# Patient Record
Sex: MALE | Race: WHITE | NOT HISPANIC OR LATINO
[De-identification: names, ages, dates, MRNs, and addresses within clinical notes are randomized per-mention and may not be internally consistent; named-entity substitution may affect disease eponyms.]

---

## 2022-08-26 PROBLEM — Z00.00 ENCOUNTER FOR PREVENTIVE HEALTH EXAMINATION: Status: ACTIVE | Noted: 2022-08-26

## 2022-09-26 ENCOUNTER — APPOINTMENT (OUTPATIENT)
Dept: VASCULAR SURGERY | Facility: CLINIC | Age: 62
End: 2022-09-26

## 2022-09-26 VITALS
OXYGEN SATURATION: 97 % | TEMPERATURE: 98.8 F | SYSTOLIC BLOOD PRESSURE: 126 MMHG | WEIGHT: 120 LBS | HEIGHT: 69 IN | HEART RATE: 81 BPM | BODY MASS INDEX: 17.77 KG/M2 | RESPIRATION RATE: 18 BRPM | DIASTOLIC BLOOD PRESSURE: 70 MMHG

## 2022-09-26 DIAGNOSIS — J45.40 MODERATE PERSISTENT ASTHMA, UNCOMPLICATED: ICD-10-CM

## 2022-09-26 DIAGNOSIS — I35.9 NONRHEUMATIC AORTIC VALVE DISORDER, UNSPECIFIED: ICD-10-CM

## 2022-09-26 DIAGNOSIS — Z80.42 FAMILY HISTORY OF MALIGNANT NEOPLASM OF PROSTATE: ICD-10-CM

## 2022-09-26 DIAGNOSIS — Z83.3 FAMILY HISTORY OF DIABETES MELLITUS: ICD-10-CM

## 2022-09-26 DIAGNOSIS — G35 MULTIPLE SCLEROSIS: ICD-10-CM

## 2022-09-26 DIAGNOSIS — Z78.9 OTHER SPECIFIED HEALTH STATUS: ICD-10-CM

## 2022-09-26 PROCEDURE — 99203 OFFICE O/P NEW LOW 30 MIN: CPT

## 2022-09-26 NOTE — HISTORY OF PRESENT ILLNESS
[FreeTextEntry1] : 61-year-old male here for evaluation of longstanding lower extremity venous stasis.\par He has a longstanding bilateral medial malleoli are on and off ulcerations.  He has been assessed and treated by a number of vascular surgeons in the past.  He denies having undergone venous procedures in the past.  He has received multiple different diagnoses and recommendations.\par He reports that he developed a right medial malleoli are superficial ulceration over the last several months, which he is caring for by himself.  He has a healed left medial malleoli ulceration.\par He denies a prior history of DVT.  He has varicose veins of both lower extremities.

## 2022-09-26 NOTE — ASSESSMENT
[FreeTextEntry1] : 61-year-old male with longstanding bilateral lower extremity venous stasis, current right medial malleoli are ulceration.  He will require wound care for his right leg ulceration, and as such I have arranged for him to see me at the wound care center and the next appointment.  In the interim, he will continue to apply mupirocin and a dry gauze dressing along with compression therapy.\par In addition, we will attempt to obtain all the results of the prior vascular work-up that has been done by his other vascular consultants, in hopes to avoid repeat work-up.  He will attempt to bring results of prior work-up to the wound care visit next week.

## 2022-09-26 NOTE — PHYSICAL EXAM
[Normal Breath Sounds] : Normal breath sounds [2+] : left 2+ [Ankle Swelling (On Exam)] : not present [Varicose Veins Of Lower Extremities] : bilaterally [] : bilaterally [Alert] : alert [Oriented to Person] : oriented to person [Oriented to Place] : oriented to place [Calm] : calm [de-identified] : NAD [de-identified] : NCAT [de-identified] : supple [de-identified] : Soft, nontender, nondistended [de-identified] : Right medial malleoli are area with superficial ulceration, fibrinous exudate no erythema; left medial malleoli are area with appearance of healed ulceration

## 2022-10-07 ENCOUNTER — APPOINTMENT (OUTPATIENT)
Dept: SURGERY | Facility: CLINIC | Age: 62
End: 2022-10-07

## 2022-10-10 ENCOUNTER — APPOINTMENT (OUTPATIENT)
Dept: VASCULAR SURGERY | Facility: CLINIC | Age: 62
End: 2022-10-10

## 2022-10-14 ENCOUNTER — APPOINTMENT (OUTPATIENT)
Dept: SURGERY | Facility: CLINIC | Age: 62
End: 2022-10-14

## 2022-10-21 ENCOUNTER — APPOINTMENT (OUTPATIENT)
Dept: SURGERY | Facility: CLINIC | Age: 62
End: 2022-10-21

## 2022-11-28 ENCOUNTER — APPOINTMENT (OUTPATIENT)
Dept: VASCULAR SURGERY | Facility: CLINIC | Age: 62
End: 2022-11-28
Payer: MEDICARE

## 2022-11-28 VITALS
WEIGHT: 127 LBS | HEART RATE: 79 BPM | TEMPERATURE: 98 F | OXYGEN SATURATION: 97 % | BODY MASS INDEX: 18.75 KG/M2 | SYSTOLIC BLOOD PRESSURE: 113 MMHG | RESPIRATION RATE: 16 BRPM | DIASTOLIC BLOOD PRESSURE: 72 MMHG

## 2022-11-28 PROCEDURE — 99214 OFFICE O/P EST MOD 30 MIN: CPT

## 2022-12-01 PROBLEM — Z00.00 ENCOUNTER FOR PREVENTIVE HEALTH EXAMINATION: Status: ACTIVE | Noted: 2022-12-01

## 2023-01-10 NOTE — PHYSICAL EXAM
[Normal Breath Sounds] : Normal breath sounds [2+] : left 2+ [Ankle Swelling (On Exam)] : not present [Varicose Veins Of Lower Extremities] : bilaterally [] : bilaterally [Alert] : alert [Oriented to Person] : oriented to person [Oriented to Place] : oriented to place [Calm] : calm [de-identified] : NAD [de-identified] : NCAT [de-identified] : supple [de-identified] : Soft, nontender, nondistended [de-identified] : Bilateral medial malleoli regions with healed ulcerations no active ulcer

## 2023-01-10 NOTE — HISTORY OF PRESENT ILLNESS
[FreeTextEntry1] : 61-year-old male here for evaluation of longstanding lower extremity venous stasis.\par He has a longstanding bilateral medial malleoli are on and off ulcerations.  He has been assessed and treated by a number of vascular surgeons in the past.  He denies having undergone venous procedures in the past.  He has received multiple different diagnoses and recommendations.\par He reports that he developed a right medial malleoli are superficial ulceration over the last several months, which he is caring for by himself.  He has a healed left medial malleoli ulceration.\par He denies a prior history of DVT.  He has varicose veins of both lower extremities. [de-identified] : 12/28/22 - He extremity ulcerations have healed since her last office visit.  He has brought all the paperwork of his past vascular work-up pain.

## 2023-01-10 NOTE — ASSESSMENT
[FreeTextEntry1] : 61-year-old male with longstanding bilateral lower extremity venous stasis, history of on and off bilateral medial malleoli ulcerations, currently oral ulcers healed.\par I have reviewed his extensive vascular records which includes office appointments and reports of a CT as well as ultrasound. He does not have evidence of DVT or SVT, but there is inconclusive evidence of venous outflow stenosis in the iliac system.  There is studies including CT and MR which are not congruent.  He underwent venous duplexes in the past which revealed superficial venous reflux in focal parts of the very small saphenous vein.  He was advised to undergo IVUS to clearly delineate his venous anatomy and assess for a non-thrombotic iliac vein obstruction.  He has not yet undergone this procedure.\par I also reiterated to him that I this would be more definitive in diagnosing iliac vein pathology, and if he does have this and is treated appropriately, it may decrease recurrence of venous ulcerations.  He will consider this.

## 2023-05-22 ENCOUNTER — APPOINTMENT (OUTPATIENT)
Dept: VASCULAR SURGERY | Facility: CLINIC | Age: 63
End: 2023-05-22
Payer: MEDICARE

## 2023-05-22 VITALS
SYSTOLIC BLOOD PRESSURE: 100 MMHG | OXYGEN SATURATION: 95 % | WEIGHT: 131 LBS | DIASTOLIC BLOOD PRESSURE: 70 MMHG | HEART RATE: 55 BPM | BODY MASS INDEX: 19.35 KG/M2

## 2023-05-22 PROCEDURE — 99213 OFFICE O/P EST LOW 20 MIN: CPT

## 2023-05-30 NOTE — PHYSICAL EXAM
[Normal Breath Sounds] : Normal breath sounds [2+] : left 2+ [Ankle Swelling (On Exam)] : not present [Varicose Veins Of Lower Extremities] : bilaterally [] : bilaterally [Alert] : alert [Oriented to Person] : oriented to person [Oriented to Place] : oriented to place [Calm] : calm [de-identified] : NAD [de-identified] : NCAT [de-identified] : supple [de-identified] : Soft, nontender, nondistended [de-identified] : Bilateral medial malleoli regions with healed ulcerations no active ulcer

## 2023-05-30 NOTE — HISTORY OF PRESENT ILLNESS
[FreeTextEntry1] : 61-year-old male here for evaluation of longstanding lower extremity venous stasis.\par He has a longstanding bilateral medial malleoli are on and off ulcerations.  He has been assessed and treated by a number of vascular surgeons in the past.  He denies having undergone venous procedures in the past.  He has received multiple different diagnoses and recommendations.\par He reports that he developed a right medial malleoli are superficial ulceration over the last several months, which he is caring for by himself.  He has a healed left medial malleoli ulceration.\par He denies a prior history of DVT.  He has varicose veins of both lower extremities. [de-identified] : 5/22/23 - His lower extremity ulcerations continue to remain healed.  He denies recurrent ulcerations.

## 2023-05-30 NOTE — ASSESSMENT
[FreeTextEntry1] : 62-year-old male with longstanding bilateral lower extremity venous stasis, history of on and off bilateral medial malleoli ulcerations, currently oral ulcers healed.\par I have reviewed his extensive vascular records which includes office appointments and reports of a CT as well as ultrasound. He does not have evidence of DVT or SVT, but there is inconclusive evidence of venous outflow stenosis in the iliac system.  There is studies including CT and MR which are not congruent.  He underwent venous duplexes in the past which revealed superficial venous reflux in focal parts of the very small saphenous vein.  He was advised to undergo IVUS to clearly delineate his venous anatomy and assess for a non-thrombotic iliac vein obstruction.  He has not yet undergone this procedure.\par I also reiterated to him that I this would be more definitive in diagnosing iliac vein pathology, and if he does have this and is treated appropriately, it may decrease recurrence of venous ulcerations.  I also advised repeat bilateral lower extremity venous ultrasounds to assess his deep and superficial system for significant reflux disease, has a past ultrasounds which we have quite old.\par \par He will consider my recommendations.

## 2023-07-10 ENCOUNTER — APPOINTMENT (OUTPATIENT)
Dept: VASCULAR SURGERY | Facility: CLINIC | Age: 63
End: 2023-07-10
Payer: MEDICARE

## 2023-07-10 PROCEDURE — 93970 EXTREMITY STUDY: CPT

## 2023-08-10 ENCOUNTER — APPOINTMENT (OUTPATIENT)
Dept: VASCULAR SURGERY | Facility: CLINIC | Age: 63
End: 2023-08-10

## 2023-09-25 ENCOUNTER — APPOINTMENT (OUTPATIENT)
Dept: VASCULAR SURGERY | Facility: CLINIC | Age: 63
End: 2023-09-25
Payer: MEDICARE

## 2023-09-25 VITALS
SYSTOLIC BLOOD PRESSURE: 110 MMHG | OXYGEN SATURATION: 99 % | BODY MASS INDEX: 18.81 KG/M2 | HEART RATE: 76 BPM | DIASTOLIC BLOOD PRESSURE: 70 MMHG | WEIGHT: 127 LBS | HEIGHT: 69 IN

## 2023-09-25 PROCEDURE — 99213 OFFICE O/P EST LOW 20 MIN: CPT

## 2023-10-26 ENCOUNTER — APPOINTMENT (OUTPATIENT)
Dept: VASCULAR SURGERY | Facility: CLINIC | Age: 63
End: 2023-10-26
Payer: MEDICARE

## 2023-10-26 VITALS — WEIGHT: 125 LBS | HEIGHT: 69 IN | BODY MASS INDEX: 18.51 KG/M2

## 2023-10-26 PROCEDURE — 99214 OFFICE O/P EST MOD 30 MIN: CPT

## 2023-11-02 ENCOUNTER — APPOINTMENT (OUTPATIENT)
Dept: VASCULAR SURGERY | Facility: CLINIC | Age: 63
End: 2023-11-02
Payer: MEDICARE

## 2023-11-02 PROCEDURE — 99213 OFFICE O/P EST LOW 20 MIN: CPT

## 2023-11-09 ENCOUNTER — APPOINTMENT (OUTPATIENT)
Dept: VASCULAR SURGERY | Facility: CLINIC | Age: 63
End: 2023-11-09

## 2023-11-13 ENCOUNTER — APPOINTMENT (OUTPATIENT)
Dept: VASCULAR SURGERY | Facility: CLINIC | Age: 63
End: 2023-11-13
Payer: MEDICARE

## 2023-11-13 VITALS
BODY MASS INDEX: 18.22 KG/M2 | SYSTOLIC BLOOD PRESSURE: 118 MMHG | WEIGHT: 123 LBS | DIASTOLIC BLOOD PRESSURE: 76 MMHG | RESPIRATION RATE: 16 BRPM | HEART RATE: 69 BPM | OXYGEN SATURATION: 98 % | TEMPERATURE: 98.4 F | HEIGHT: 69 IN

## 2023-11-13 PROCEDURE — 99214 OFFICE O/P EST MOD 30 MIN: CPT

## 2023-12-11 ENCOUNTER — APPOINTMENT (OUTPATIENT)
Dept: VASCULAR SURGERY | Facility: CLINIC | Age: 63
End: 2023-12-11
Payer: MEDICARE

## 2023-12-14 ENCOUNTER — APPOINTMENT (OUTPATIENT)
Dept: VASCULAR SURGERY | Facility: CLINIC | Age: 63
End: 2023-12-14
Payer: MEDICARE

## 2023-12-14 PROCEDURE — 99213 OFFICE O/P EST LOW 20 MIN: CPT | Mod: 95

## 2023-12-14 NOTE — REASON FOR VISIT
[Home] : at home, [unfilled] , at the time of the visit. [Medical Office: ([facility])___] : at the medical office located in  [Patient] : the patient [Self] : self [This encounter was initiated by telehealth (audio with video) and converted to telephone (audio only) due to technical difficulties.] : This encounter was initiated by telehealth (audio with video) and converted to telephone (audio only) due to technical difficulties. [Follow-Up: _____] : a [unfilled] follow-up visit

## 2023-12-18 ENCOUNTER — APPOINTMENT (OUTPATIENT)
Dept: VASCULAR SURGERY | Facility: CLINIC | Age: 63
End: 2023-12-18

## 2024-01-19 NOTE — HISTORY OF PRESENT ILLNESS
[FreeTextEntry1] : 61-year-old male here for evaluation of longstanding lower extremity venous stasis.\par  He has a longstanding bilateral medial malleoli are on and off ulcerations.  He has been assessed and treated by a number of vascular surgeons in the past.  He denies having undergone venous procedures in the past.  He has received multiple different diagnoses and recommendations.\par  He reports that he developed a right medial malleoli are superficial ulceration over the last several months, which he is caring for by himself.  He has a healed left medial malleoli ulceration.\par  He denies a prior history of DVT.  He has varicose veins of both lower extremities. [de-identified] : 5/22/23 - His lower extremity ulcerations continue to remain healed.  He denies recurrent ulcerations.  9/25/23 - He reports that he has developed bilateral medial malleolus focal ulcerations, which she has performed his own wound care for.  He underwent venous ultrasound which revealed bilateral saphenous vein reflux.  10/26/23 - The patient returns in follow up for bilateral medial malleolus ulcerations.  His medial malleoli or ulcerations are slow to heal.  11/2/23 - The patient returns in follow up. He tolerated the Unna boots but was unhappy with his experience. His right medial malleolus ulceration on the left is healed and his right medial malleolus ulceration is improving.  11/13/23 - He reports that he kept his Unna boots on several days longer than instructed, he was unable to make his last appointment. Bilateral medial malleoli or ulcerations are now fully healed.  12/14/23 - The patient returned via telehealth visit. His right leg ulcer has re-opened since his last visit. He reports a scab has started to form over the wound. He reports that he has developed gastroenteritis. He would like to discuss the details of ablative treatment.

## 2024-01-19 NOTE — ASSESSMENT
[FreeTextEntry1] : 63-year-old male with a history of recurrent bilateral medial malleoli or ulcerations was undergone extensive vascular work-up by multiple vascular surgeons in the past. He has received a variety of opinions, he has not undergone any intervention. On work-up, he does have saphenous vein superficial reflux. I discussed with him that strongly recommend Unna boot treatment for closure of his currently active ulcerations. I have advised him to see me at the wound care center for this and have assisted him in obtaining an appointment either this Wednesday or next Monday. In addition, I have advised closure of the superficial venous reflux to minimize chance of recurrence of ulcerations. I discussed that we can proceed with this after his ulcer is closed.  On follow up, the patient's right medial malleolus ulcerations has reopened. I recommended he follow up in person for further evaluation. I explained that I need to examine him in person before I can recommend treatment options for his wound. He agrees with this plan. He will continue use of compression on a daily basis.

## 2024-01-19 NOTE — END OF VISIT
[FreeTextEntry3] : All medical record entries made by the Patriciaibe were at my, KARAN SHEA, direction and personally dictated by me on 12/14/2023. I have reviewed the chart and agree that the record accurately reflects my personal performance of the history, physical exam, assessment and plan. I have also personally directed, reviewed, and agreed with the chart.

## 2024-01-25 ENCOUNTER — APPOINTMENT (OUTPATIENT)
Dept: CARDIOLOGY | Facility: CLINIC | Age: 64
End: 2024-01-25
Payer: MEDICARE

## 2024-01-25 ENCOUNTER — NON-APPOINTMENT (OUTPATIENT)
Age: 64
End: 2024-01-25

## 2024-01-25 VITALS
DIASTOLIC BLOOD PRESSURE: 68 MMHG | HEIGHT: 69 IN | BODY MASS INDEX: 18.37 KG/M2 | WEIGHT: 124 LBS | OXYGEN SATURATION: 99 % | HEART RATE: 64 BPM | SYSTOLIC BLOOD PRESSURE: 104 MMHG

## 2024-01-25 DIAGNOSIS — N40.0 BENIGN PROSTATIC HYPERPLASIA WITHOUT LOWER URINARY TRACT SYMPMS: ICD-10-CM

## 2024-01-25 DIAGNOSIS — Z87.39 PERSONAL HISTORY OF OTHER DISEASES OF THE MUSCULOSKELETAL SYSTEM AND CONNECTIVE TISSUE: ICD-10-CM

## 2024-01-25 DIAGNOSIS — Z86.79 PERSONAL HISTORY OF OTHER DISEASES OF THE CIRCULATORY SYSTEM: ICD-10-CM

## 2024-01-25 PROCEDURE — 93246 EXT ECG>7D<15D RECORDING: CPT

## 2024-01-25 PROCEDURE — 99203 OFFICE O/P NEW LOW 30 MIN: CPT | Mod: 25

## 2024-01-25 PROCEDURE — 93000 ELECTROCARDIOGRAM COMPLETE: CPT | Mod: 59

## 2024-01-25 RX ORDER — CLONAZEPAM 0.5 MG/1
0.5 TABLET ORAL 3 TIMES DAILY
Refills: 0 | Status: ACTIVE | COMMUNITY
Start: 2024-01-25

## 2024-01-25 RX ORDER — DUTASTERIDE 0.5 MG/1
0.5 CAPSULE, LIQUID FILLED ORAL
Refills: 0 | Status: ACTIVE | COMMUNITY
Start: 2024-01-25

## 2024-01-25 RX ORDER — BUSPIRONE HYDROCHLORIDE 15 MG/1
15 TABLET ORAL
Refills: 0 | Status: ACTIVE | COMMUNITY

## 2024-02-12 ENCOUNTER — APPOINTMENT (OUTPATIENT)
Dept: VASCULAR SURGERY | Facility: CLINIC | Age: 64
End: 2024-02-12
Payer: MEDICARE

## 2024-02-12 VITALS
SYSTOLIC BLOOD PRESSURE: 104 MMHG | RESPIRATION RATE: 16 BRPM | WEIGHT: 125 LBS | DIASTOLIC BLOOD PRESSURE: 70 MMHG | BODY MASS INDEX: 18.46 KG/M2 | OXYGEN SATURATION: 98 % | HEART RATE: 60 BPM | TEMPERATURE: 98 F

## 2024-02-12 DIAGNOSIS — I87.2 VENOUS INSUFFICIENCY (CHRONIC) (PERIPHERAL): ICD-10-CM

## 2024-02-12 PROCEDURE — 99214 OFFICE O/P EST MOD 30 MIN: CPT

## 2024-02-12 RX ORDER — LEVOMEFOLATE/ALGAL OIL 7.5-90.314
CAPSULE ORAL
Refills: 0 | Status: ACTIVE | COMMUNITY

## 2024-02-12 RX ORDER — FAMOTIDINE 20 MG/1
20 TABLET, FILM COATED ORAL
Refills: 0 | Status: ACTIVE | COMMUNITY

## 2024-02-12 NOTE — DATA REVIEWED
[FreeTextEntry1] : Bilateral lower extremity venous duplex performed at our [facility] lab  No DVT or SVT Iliac veins not fully visualized, but flow inferior to this is phasic, no duplex evidence of venous outflow obstruction Extensive superficial venous reflux in bilateral GSV's, as well as right anterior accessory saphenous vein Vein size greater than 6 mm

## 2024-02-12 NOTE — ASSESSMENT
[FreeTextEntry1] : 63-year-old male with a history of recurrent bilateral medial malleoli or ulcerations was undergone extensive vascular work-up by multiple vascular surgeons in the past. He has received a variety of opinions, he has not undergone any intervention. On work-up, he does have saphenous vein superficial reflux. I discussed with him that strongly recommend Unna boot treatment for closure of his currently active ulcerations. I have advised him to see me at the wound care center for this and have assisted him in obtaining an appointment either this Wednesday or next Monday. In addition, I have advised closure of the superficial venous reflux to minimize chance of recurrence of ulcerations. I discussed that we can proceed with this after his ulcer is closed.  On follow up, the patient's medial malleolus ulcerations are now healed with unna boot treatment. I recommended he moisturize and use compression on a daily basis.  Duplex reveals superficial venous reflux bilaterally as indicated above.  I discussed with the patient the treatment of his superficial venous reflux will reduce his chance of recurrent ulcerations.  We discussed chronic procedure, risk, benefits, short and long-term outcomes.  He wished to proceed.  Although he was supposed to undergo right leg GSV closure previously, which was postponed due to patient and social issues, he now wishes to proceed with treatment.  We will schedule right leg GSV closure followed by left.  He will continue use of compression on a daily basis.

## 2024-02-12 NOTE — HISTORY OF PRESENT ILLNESS
[FreeTextEntry1] : 61-year-old male here for evaluation of longstanding lower extremity venous stasis.\par  He has a longstanding bilateral medial malleoli are on and off ulcerations.  He has been assessed and treated by a number of vascular surgeons in the past.  He denies having undergone venous procedures in the past.  He has received multiple different diagnoses and recommendations.\par  He reports that he developed a right medial malleoli are superficial ulceration over the last several months, which he is caring for by himself.  He has a healed left medial malleoli ulceration.\par  He denies a prior history of DVT.  He has varicose veins of both lower extremities. [de-identified] : 5/22/23 - His lower extremity ulcerations continue to remain healed.  He denies recurrent ulcerations.  9/25/23 - He reports that he has developed bilateral medial malleolus focal ulcerations, which she has performed his own wound care for.  He underwent venous ultrasound which revealed bilateral saphenous vein reflux.  10/26/23 - The patient returns in follow up for bilateral medial malleolus ulcerations.  His medial malleoli or ulcerations are slow to heal.  2/12/24 - His bilateral medial malleoli or ulcerations are healed.  He reports that he utilizes compression on a consistent basis.  Grade 2, 20 to 30 mmHg.  He was previously scheduled to undergo venous ablation of his right GSV reflux, but this did not occur.  11/2/23 - The patient returns in follow up. He tolerated the Unna boots but was unhappy with his experience. His right medial malleolus ulceration on the left is healed and his right medial malleolus ulceration is improving.  11/13/23 - He reports that he kept his Unna boots on several days longer than instructed, he was unable to make his last appointment. Bilateral medial malleoli or ulcerations are now fully healed.

## 2024-02-12 NOTE — PHYSICAL EXAM
[Normal Breath Sounds] : Normal breath sounds [2+] : left 2+ [Ankle Swelling (On Exam)] : not present [Varicose Veins Of Lower Extremities] : bilaterally [] : bilaterally [Alert] : alert [Oriented to Person] : oriented to person [Oriented to Place] : oriented to place [Calm] : calm [de-identified] : NAD [de-identified] : NCAT [de-identified] : supple [de-identified] : Soft, nontender, nondistended [de-identified] : Bilateral medial malleoli ulceration healed

## 2024-02-15 PROCEDURE — 93248 EXT ECG>7D<15D REV&INTERPJ: CPT

## 2024-02-28 ENCOUNTER — APPOINTMENT (OUTPATIENT)
Dept: CARDIOLOGY | Facility: CLINIC | Age: 64
End: 2024-02-28
Payer: MEDICARE

## 2024-02-28 PROCEDURE — 99442: CPT

## 2024-03-27 ENCOUNTER — APPOINTMENT (OUTPATIENT)
Dept: CARDIOLOGY | Facility: CLINIC | Age: 64
End: 2024-03-27
Payer: MEDICARE

## 2024-03-27 VITALS
OXYGEN SATURATION: 98 % | HEART RATE: 62 BPM | SYSTOLIC BLOOD PRESSURE: 108 MMHG | DIASTOLIC BLOOD PRESSURE: 62 MMHG | WEIGHT: 126 LBS | BODY MASS INDEX: 18.66 KG/M2 | HEIGHT: 69 IN

## 2024-03-27 PROCEDURE — 99213 OFFICE O/P EST LOW 20 MIN: CPT

## 2024-03-27 NOTE — HISTORY OF PRESENT ILLNESS
[FreeTextEntry1] : Patient had felt episodes of dizziness daily. Episodes last for a few seconds. Not sure if he hjas palpitations. No chest pain. Zio revealed 1 episode of 4 beat ventricular tachycardia and 32 runs of SVT the longest 20. complexes.

## 2024-03-27 NOTE — PHYSICAL EXAM
[No Acute Distress] : no acute distress [Normal Conjunctiva] : normal conjunctiva [Normal Venous Pressure] : normal venous pressure [No Carotid Bruit] : no carotid bruit [Normal S1, S2] : normal S1, S2 [Clear Lung Fields] : clear lung fields [Non Tender] : non-tender [Soft] : abdomen soft [de-identified] : anxious affect [de-identified] : mild pretibial edema

## 2024-04-11 ENCOUNTER — APPOINTMENT (OUTPATIENT)
Dept: HEART AND VASCULAR | Facility: CLINIC | Age: 64
End: 2024-04-11

## 2024-04-17 ENCOUNTER — APPOINTMENT (OUTPATIENT)
Dept: HEART AND VASCULAR | Facility: CLINIC | Age: 64
End: 2024-04-17

## 2024-04-18 RX ORDER — LOSARTAN POTASSIUM 25 MG/1
25 TABLET, FILM COATED ORAL DAILY
Qty: 90 | Refills: 3 | Status: ACTIVE | COMMUNITY

## 2024-05-02 ENCOUNTER — APPOINTMENT (OUTPATIENT)
Dept: HEART AND VASCULAR | Facility: CLINIC | Age: 64
End: 2024-05-02
Payer: MEDICARE

## 2024-05-02 VITALS
WEIGHT: 127.5 LBS | RESPIRATION RATE: 16 BRPM | SYSTOLIC BLOOD PRESSURE: 100 MMHG | HEIGHT: 69 IN | BODY MASS INDEX: 18.88 KG/M2 | OXYGEN SATURATION: 98 % | HEART RATE: 57 BPM | DIASTOLIC BLOOD PRESSURE: 60 MMHG

## 2024-05-02 DIAGNOSIS — R42 DIZZINESS AND GIDDINESS: ICD-10-CM

## 2024-05-02 PROCEDURE — 93000 ELECTROCARDIOGRAM COMPLETE: CPT

## 2024-05-02 PROCEDURE — 99204 OFFICE O/P NEW MOD 45 MIN: CPT | Mod: 25

## 2024-05-02 NOTE — REASON FOR VISIT
[Arrhythmia/ECG Abnorrmalities] : arrhythmia/ECG abnormalities [Other: ____] : [unfilled] [FreeTextEntry3] : Joseph Price

## 2024-05-02 NOTE — DISCUSSION/SUMMARY
[FreeTextEntry1] : Impression 1 Lightheadedness and near syncope with preserved LVEF and largely normal 14 day monitor study 2. Symptoms of chest discomfort 25-40 times while wearing ziopatch without significant arrhythmia 3. Anxiety disorder on medications 4. History of mulitiple sclerosis but not followed by a neurologist currently 5. HTN  Plan Mr. Neal likely suffers from orthostasis or neural regulatory syncope.  I recommended ILR to correlate rhythm with future events.  I do not believe that his symptoms of chest discomfort are related to arrhythmia.  At the current time he would like to think about implantation.  In the interim, I provided reassurance and instructions of avoidance therapy.  Follow up PRN.  [EKG obtained to assist in diagnosis and management of assessed problem(s)] : EKG obtained to assist in diagnosis and management of assessed problem(s)
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
## 2024-05-16 ENCOUNTER — APPOINTMENT (OUTPATIENT)
Dept: CARDIOLOGY | Facility: CLINIC | Age: 64
End: 2024-05-16
Payer: MEDICARE

## 2024-05-16 VITALS
BODY MASS INDEX: 18.88 KG/M2 | DIASTOLIC BLOOD PRESSURE: 66 MMHG | HEIGHT: 69 IN | OXYGEN SATURATION: 97 % | WEIGHT: 127.5 LBS | HEART RATE: 64 BPM | SYSTOLIC BLOOD PRESSURE: 100 MMHG

## 2024-05-16 DIAGNOSIS — I10 ESSENTIAL (PRIMARY) HYPERTENSION: ICD-10-CM

## 2024-05-16 DIAGNOSIS — R06.00 DYSPNEA, UNSPECIFIED: ICD-10-CM

## 2024-05-16 DIAGNOSIS — R00.1 BRADYCARDIA, UNSPECIFIED: ICD-10-CM

## 2024-05-16 PROCEDURE — 36415 COLL VENOUS BLD VENIPUNCTURE: CPT

## 2024-05-16 PROCEDURE — 93306 TTE W/DOPPLER COMPLETE: CPT

## 2024-05-16 PROCEDURE — 99215 OFFICE O/P EST HI 40 MIN: CPT | Mod: 25

## 2024-05-16 NOTE — HISTORY OF PRESENT ILLNESS
[FreeTextEntry1] : Patient here for f/u. Has hand full of past records that he reads. When asked for specific complaint today says fatigued, labored breathing, dizzy, heavy eyes, lightheaded. Meds reviewed. Taking losartan 25 mg bid (I have taken 2x per day for years). Patient was seen by EPS Dr Banks. A loop recorder was suggested to the patient but declined

## 2024-05-16 NOTE — PHYSICAL EXAM
[Normal Venous Pressure] : normal venous pressure [No Carotid Bruit] : no carotid bruit [Normal S1, S2] : normal S1, S2 [Clear Lung Fields] : clear lung fields [No Edema] : no edema [de-identified] : Contiuously talking [de-identified] : rambling talking

## 2024-05-16 NOTE — ASSESSMENT
[FreeTextEntry1] : Continue losartan 25 mg qd. May add second dose if BP elevated late in day.. Routine labs RTO 2 months

## 2024-05-20 LAB
ALBUMIN SERPL ELPH-MCNC: 4.5 G/DL
ALP BLD-CCNC: 85 U/L
ALT SERPL-CCNC: 40 U/L
ANION GAP SERPL CALC-SCNC: 12 MMOL/L
APPEARANCE: CLEAR
AST SERPL-CCNC: 45 U/L
BASOPHILS # BLD AUTO: 0.08 K/UL
BASOPHILS NFR BLD AUTO: 1.9 %
BILIRUB SERPL-MCNC: 0.3 MG/DL
BILIRUBIN URINE: NEGATIVE
BLOOD URINE: NEGATIVE
BUN SERPL-MCNC: 21 MG/DL
CALCIUM SERPL-MCNC: 9.4 MG/DL
CHLORIDE SERPL-SCNC: 103 MMOL/L
CHOLEST SERPL-MCNC: 139 MG/DL
CO2 SERPL-SCNC: 24 MMOL/L
COLOR: YELLOW
CREAT SERPL-MCNC: 0.78 MG/DL
EGFR: 100 ML/MIN/1.73M2
EOSINOPHIL # BLD AUTO: 0.04 K/UL
EOSINOPHIL NFR BLD AUTO: 0.9 %
GLUCOSE QUALITATIVE U: NEGATIVE MG/DL
GLUCOSE SERPL-MCNC: 85 MG/DL
HCT VFR BLD CALC: 37.4 %
HDLC SERPL-MCNC: 57 MG/DL
HGB BLD-MCNC: 12.5 G/DL
IMM GRANULOCYTES NFR BLD AUTO: 0 %
KETONES URINE: NEGATIVE MG/DL
LDLC SERPL CALC-MCNC: 67 MG/DL
LEUKOCYTE ESTERASE URINE: NEGATIVE
LYMPHOCYTES # BLD AUTO: 1.5 K/UL
LYMPHOCYTES NFR BLD AUTO: 35.3 %
MAN DIFF?: NORMAL
MCHC RBC-ENTMCNC: 30.7 PG
MCHC RBC-ENTMCNC: 33.4 GM/DL
MCV RBC AUTO: 91.9 FL
MONOCYTES # BLD AUTO: 0.46 K/UL
MONOCYTES NFR BLD AUTO: 10.8 %
NEUTROPHILS # BLD AUTO: 2.17 K/UL
NEUTROPHILS NFR BLD AUTO: 51.1 %
NITRITE URINE: NEGATIVE
NONHDLC SERPL-MCNC: 82 MG/DL
PH URINE: 7
PLATELET # BLD AUTO: 172 K/UL
POTASSIUM SERPL-SCNC: 4.4 MMOL/L
PROT SERPL-MCNC: 6.4 G/DL
PROTEIN URINE: NEGATIVE MG/DL
RBC # BLD: 4.07 M/UL
RBC # FLD: 13 %
SODIUM SERPL-SCNC: 140 MMOL/L
SPECIFIC GRAVITY URINE: 1.01
TRIGL SERPL-MCNC: 78 MG/DL
UROBILINOGEN URINE: 0.2 MG/DL
WBC # FLD AUTO: 4.25 K/UL

## 2024-06-24 ENCOUNTER — APPOINTMENT (OUTPATIENT)
Dept: CARDIOLOGY | Facility: CLINIC | Age: 64
End: 2024-06-24
Payer: MEDICARE

## 2024-06-24 VITALS
OXYGEN SATURATION: 99 % | WEIGHT: 127 LBS | DIASTOLIC BLOOD PRESSURE: 78 MMHG | HEIGHT: 69 IN | SYSTOLIC BLOOD PRESSURE: 130 MMHG | BODY MASS INDEX: 18.81 KG/M2 | HEART RATE: 66 BPM

## 2024-06-24 DIAGNOSIS — F41.9 ANXIETY DISORDER, UNSPECIFIED: ICD-10-CM

## 2024-06-24 DIAGNOSIS — R00.2 PALPITATIONS: ICD-10-CM

## 2024-06-24 DIAGNOSIS — R23.2 FLUSHING: ICD-10-CM

## 2024-06-24 DIAGNOSIS — I47.10 SUPRAVENTRICULAR TACHYCARDIA, UNSPECIFIED: ICD-10-CM

## 2024-06-24 PROCEDURE — 99213 OFFICE O/P EST LOW 20 MIN: CPT

## 2024-07-29 RX ORDER — BLOOD PRESSURE TEST KIT-MEDIUM
KIT MISCELLANEOUS
Qty: 1 | Refills: 0 | Status: ACTIVE | COMMUNITY
Start: 2024-07-29 | End: 1900-01-01

## 2024-08-08 ENCOUNTER — APPOINTMENT (OUTPATIENT)
Dept: CARDIOLOGY | Facility: CLINIC | Age: 64
End: 2024-08-08

## 2024-08-08 PROBLEM — R07.89 ATYPICAL CHEST PAIN: Status: ACTIVE | Noted: 2024-08-08

## 2024-08-08 PROCEDURE — 93000 ELECTROCARDIOGRAM COMPLETE: CPT

## 2024-08-08 PROCEDURE — 99215 OFFICE O/P EST HI 40 MIN: CPT | Mod: 25

## 2024-08-08 NOTE — HISTORY OF PRESENT ILLNESS
[FreeTextEntry1] : Patient has felt occasional palpitations. Admit to infrequent warm chest tightness. No discrete pain. No SOB

## 2024-08-08 NOTE — PHYSICAL EXAM
[Normal Venous Pressure] : normal venous pressure [Normal S1, S2] : normal S1, S2 [No Murmur] : no murmur [Clear Lung Fields] : clear lung fields [No Edema] : no edema [de-identified] : anxious affect [de-identified] : foot drop

## 2024-12-04 ENCOUNTER — APPOINTMENT (OUTPATIENT)
Dept: CARDIOLOGY | Facility: CLINIC | Age: 64
End: 2024-12-04
Payer: MEDICARE

## 2024-12-04 VITALS
RESPIRATION RATE: 16 BRPM | BODY MASS INDEX: 17.77 KG/M2 | SYSTOLIC BLOOD PRESSURE: 130 MMHG | HEIGHT: 69 IN | DIASTOLIC BLOOD PRESSURE: 80 MMHG | HEART RATE: 64 BPM | WEIGHT: 120 LBS | OXYGEN SATURATION: 99 %

## 2024-12-04 DIAGNOSIS — R07.89 OTHER CHEST PAIN: ICD-10-CM

## 2024-12-04 DIAGNOSIS — R00.2 PALPITATIONS: ICD-10-CM

## 2024-12-04 DIAGNOSIS — I47.10 SUPRAVENTRICULAR TACHYCARDIA, UNSPECIFIED: ICD-10-CM

## 2024-12-04 PROCEDURE — 99213 OFFICE O/P EST LOW 20 MIN: CPT

## 2024-12-16 ENCOUNTER — APPOINTMENT (OUTPATIENT)
Dept: VASCULAR SURGERY | Facility: CLINIC | Age: 64
End: 2024-12-16
Payer: MEDICARE

## 2024-12-16 ENCOUNTER — APPOINTMENT (OUTPATIENT)
Dept: VASCULAR SURGERY | Facility: CLINIC | Age: 64
End: 2024-12-16

## 2024-12-16 VITALS
HEIGHT: 69 IN | WEIGHT: 119 LBS | BODY MASS INDEX: 17.63 KG/M2 | SYSTOLIC BLOOD PRESSURE: 110 MMHG | DIASTOLIC BLOOD PRESSURE: 80 MMHG

## 2024-12-16 DIAGNOSIS — I87.2 VENOUS INSUFFICIENCY (CHRONIC) (PERIPHERAL): ICD-10-CM

## 2024-12-16 PROCEDURE — 99214 OFFICE O/P EST MOD 30 MIN: CPT

## 2024-12-16 RX ORDER — BECLOMETHASONE DIPROPIONATE HFA 40 UG/1
40 AEROSOL, METERED RESPIRATORY (INHALATION)
Refills: 0 | Status: ACTIVE | COMMUNITY

## 2024-12-16 RX ORDER — LACTULOSE 10 G/10G
10 SOLUTION ORAL
Refills: 0 | Status: ACTIVE | COMMUNITY

## 2025-03-06 ENCOUNTER — APPOINTMENT (OUTPATIENT)
Dept: CARDIOLOGY | Facility: CLINIC | Age: 65
End: 2025-03-06

## 2025-06-10 ENCOUNTER — NON-APPOINTMENT (OUTPATIENT)
Age: 65
End: 2025-06-10

## 2025-06-10 ENCOUNTER — APPOINTMENT (OUTPATIENT)
Dept: NEUROLOGY | Facility: CLINIC | Age: 65
End: 2025-06-10
Payer: MEDICARE

## 2025-06-10 VITALS
WEIGHT: 119 LBS | BODY MASS INDEX: 17.57 KG/M2 | OXYGEN SATURATION: 95 % | SYSTOLIC BLOOD PRESSURE: 138 MMHG | DIASTOLIC BLOOD PRESSURE: 92 MMHG | HEART RATE: 62 BPM

## 2025-06-10 PROBLEM — R41.9 COGNITIVE COMPLAINTS: Status: ACTIVE | Noted: 2025-06-10

## 2025-06-10 PROCEDURE — 99205 OFFICE O/P NEW HI 60 MIN: CPT

## 2025-08-13 ENCOUNTER — RESULT REVIEW (OUTPATIENT)
Age: 65
End: 2025-08-13

## 2025-09-04 ENCOUNTER — NON-APPOINTMENT (OUTPATIENT)
Age: 65
End: 2025-09-04